# Patient Record
Sex: FEMALE | Race: WHITE | NOT HISPANIC OR LATINO | Employment: FULL TIME | URBAN - METROPOLITAN AREA
[De-identification: names, ages, dates, MRNs, and addresses within clinical notes are randomized per-mention and may not be internally consistent; named-entity substitution may affect disease eponyms.]

---

## 2017-01-04 ENCOUNTER — ALLSCRIPTS OFFICE VISIT (OUTPATIENT)
Dept: OTHER | Facility: OTHER | Age: 22
End: 2017-01-04

## 2017-07-27 ENCOUNTER — TRANSCRIBE ORDERS (OUTPATIENT)
Dept: ADMINISTRATIVE | Facility: HOSPITAL | Age: 22
End: 2017-07-27

## 2017-07-27 DIAGNOSIS — B18.2 CHRONIC HEPATITIS C WITH HEPATIC COMA (HCC): Primary | ICD-10-CM

## 2017-08-09 ENCOUNTER — HOSPITAL ENCOUNTER (OUTPATIENT)
Dept: RADIOLOGY | Facility: HOSPITAL | Age: 22
Discharge: HOME | End: 2017-08-09
Payer: COMMERCIAL

## 2017-08-09 DIAGNOSIS — B18.2 CHRONIC HEPATITIS C WITH HEPATIC COMA (HCC): ICD-10-CM

## 2017-08-09 PROCEDURE — 76705 ECHO EXAM OF ABDOMEN: CPT

## 2017-11-01 ENCOUNTER — ALLSCRIPTS OFFICE VISIT (OUTPATIENT)
Dept: OTHER | Facility: OTHER | Age: 22
End: 2017-11-01

## 2017-11-01 LAB
CLUE CELL (HISTORICAL): NORMAL
HYPHAL YEAST (HISTORICAL): NORMAL
KOH PREP (HISTORICAL): NORMAL
PH UR STRIP.AUTO: 5 [PH]
TRICHOMONAS (HISTORICAL): NORMAL
YEAST (HISTORICAL): NORMAL

## 2018-01-13 VITALS
SYSTOLIC BLOOD PRESSURE: 91 MMHG | DIASTOLIC BLOOD PRESSURE: 54 MMHG | OXYGEN SATURATION: 98 % | TEMPERATURE: 98.4 F | WEIGHT: 139 LBS | HEART RATE: 79 BPM | BODY MASS INDEX: 23.49 KG/M2

## 2018-01-14 VITALS
SYSTOLIC BLOOD PRESSURE: 106 MMHG | BODY MASS INDEX: 21.66 KG/M2 | DIASTOLIC BLOOD PRESSURE: 68 MMHG | WEIGHT: 130 LBS | HEIGHT: 65 IN

## 2018-01-14 NOTE — MISCELLANEOUS
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  Pt advised she has a high viral load of >164,000  We reviewed she should not drink alcohol and that she may have permanent liver damage  We also reviewed the risk of hepatocellular carcinoma  Pt advised to see GI ASAP  Phone number given for local GI group and for one in [facility] where patient is in rehab  Pt directed to [facility] web site for further information  All questions answered  to call with any further questions        Plan  Hepatitis C virus infection, unspecified chronicity    · *1 - 1 [facility] Physician Referral  Consult  Status: Hold For -  Scheduling  Requested for: [id number]  Care Summary provided  : Yes    Signatures   Electronically signed by : MYCHAL Deal ; Nov 23 2016  1:15PM EST                       (Author)

## 2018-10-11 ENCOUNTER — ANNUAL EXAM (OUTPATIENT)
Dept: OBGYN CLINIC | Facility: CLINIC | Age: 23
End: 2018-10-11
Payer: COMMERCIAL

## 2018-10-11 VITALS
SYSTOLIC BLOOD PRESSURE: 102 MMHG | HEIGHT: 65 IN | DIASTOLIC BLOOD PRESSURE: 64 MMHG | BODY MASS INDEX: 20.99 KG/M2 | WEIGHT: 126 LBS

## 2018-10-11 DIAGNOSIS — Z01.411 ENCOUNTER FOR GYNECOLOGICAL EXAMINATION WITH ABNORMAL FINDING: Primary | ICD-10-CM

## 2018-10-11 DIAGNOSIS — Z11.3 SCREENING EXAMINATION FOR VENEREAL DISEASE: ICD-10-CM

## 2018-10-11 DIAGNOSIS — B19.20 HEPATITIS C VIRUS INFECTION WITHOUT HEPATIC COMA, UNSPECIFIED CHRONICITY: ICD-10-CM

## 2018-10-11 DIAGNOSIS — Z30.09 ENCOUNTER FOR GENERAL COUNSELING AND ADVICE ON CONTRACEPTIVE MANAGEMENT: ICD-10-CM

## 2018-10-11 PROCEDURE — 99385 PREV VISIT NEW AGE 18-39: CPT | Performed by: NURSE PRACTITIONER

## 2018-10-11 NOTE — PROGRESS NOTES
Assessment / Plan    1  Encounter for gynecological examination with abnormal finding  Normal well woman exam  Pap smear obtained    2  Encounter for general counseling and advice on contraceptive management  Given history of hepatitis C infection and lack of recent follow up, would recommend LN-IUD as best option for birth control  After assessment of her liver function and proper follow up, will consider other options if necessary  3  Hepatitis C virus infection without hepatic coma, unspecified chronicity  As above  Given referral for PCP  - Hepatic function panel; Future  - Hepatic function panel    4  Screening examination for venereal disease  W/ GC and chlamydia off pap    - HIV 1/2 AG-AB combo; Future  - RPR; Future  - Hepatitis B surface antigen; Future  - RPR      Subjective      Joan Benz is a 25 y o  female who presents for her annual gynecologic exam     H/o hepatitis C infection related to IV drug abuse  She has been clean for over 2 years  Was treated with 3 months of antivirals approximately one year ago  Has not had any follow up since due to bills being to costly  Sexually active, partner of 4 months duration  Using condoms  Interested in birth control  Last pap: none yet  STD screenin    Periods are regular every 28-30 days  Current contraception: condoms  History of abnormal Pap smear: no  Family history of breast,uterine, ovarian or colon cancer: no    Menstrual History:  OB History      Para Term  AB Living    1 1       1    SAB TAB Ectopic Multiple Live Births                      Menarche age: 6  Patient's last menstrual period was 2018 (exact date)    Period Cycle (Days): 28  Period Pattern: Regular  Dysmenorrhea: None    The following portions of the patient's history were reviewed and updated as appropriate: allergies, current medications, past family history, past medical history, past social history, past surgical history and problem list     Review of Systems      Review of Systems   Constitutional: Negative for chills and fever  Gastrointestinal: Negative for abdominal distention, abdominal pain, blood in stool, constipation, diarrhea, nausea and vomiting  Genitourinary: Negative for difficulty urinating, dysuria, frequency, genital sores, hematuria, menstrual problem, pelvic pain, urgency, vaginal bleeding and vaginal discharge  Breasts:  Negative for skin changes, dimpling, asymmetry, nipple discharge, redness, tenderness or palpable masses    Objective      /64 (BP Location: Left arm, Patient Position: Sitting)   Ht 5' 4 57" (1 64 m)   Wt 57 2 kg (126 lb)   LMP 09/23/2018 (Exact Date)   Breastfeeding? No   BMI 21 25 kg/m²      Physical Exam   Constitutional: She is oriented to person, place, and time  She appears well-developed and well-nourished  No distress  Neck: Neck supple  No thyromegaly present  Pulmonary/Chest: Right breast exhibits no inverted nipple, no mass, no nipple discharge, no skin change and no tenderness  Left breast exhibits no inverted nipple, no mass, no nipple discharge, no skin change and no tenderness  Breasts are symmetrical    Abdominal: Soft  Normal appearance  She exhibits no mass  There is no tenderness  There is no CVA tenderness  Genitourinary: No labial fusion  There is no rash, tenderness, lesion or injury on the right labia  There is no rash, tenderness, lesion or injury on the left labia  Uterus is not enlarged and not tender  Cervix exhibits no motion tenderness, no discharge and no friability  Right adnexum displays no mass and no tenderness  Left adnexum displays no mass and no tenderness  No erythema, tenderness or bleeding in the vagina  No foreign body in the vagina  No signs of injury around the vagina  No vaginal discharge found  Lymphadenopathy:     She has no cervical adenopathy  She has no axillary adenopathy          Right: No inguinal and no supraclavicular adenopathy present  Left: No inguinal and no supraclavicular adenopathy present  Neurological: She is alert and oriented to person, place, and time  Skin: Skin is warm, dry and intact  Psychiatric: She has a normal mood and affect   Her behavior is normal

## 2018-10-15 LAB
C TRACH RRNA SPEC QL NAA+PROBE: NOT DETECTED
CLINICAL INFO: NORMAL
CYTO CVX: NORMAL
CYTOLOGY CMNT CVX/VAG CYTO-IMP: NORMAL
DATE PREVIOUS BX: NORMAL
LMP START DATE: NORMAL
N GONORRHOEA RRNA SPEC QL NAA+PROBE: NOT DETECTED
SL AMB PREV. PAP:: NORMAL
SPECIMEN SOURCE CVX/VAG CYTO: NORMAL

## 2018-11-15 ENCOUNTER — PROCEDURE VISIT (OUTPATIENT)
Dept: OBGYN CLINIC | Facility: CLINIC | Age: 23
End: 2018-11-15
Payer: COMMERCIAL

## 2018-11-15 VITALS
SYSTOLIC BLOOD PRESSURE: 106 MMHG | HEART RATE: 90 BPM | DIASTOLIC BLOOD PRESSURE: 64 MMHG | BODY MASS INDEX: 21.69 KG/M2 | WEIGHT: 128.6 LBS

## 2018-11-15 DIAGNOSIS — Z30.430 ENCOUNTER FOR IUD INSERTION: Primary | ICD-10-CM

## 2018-11-15 LAB — SL AMB POCT URINE HCG: NEGATIVE

## 2018-11-15 PROCEDURE — 58300 INSERT INTRAUTERINE DEVICE: CPT | Performed by: NURSE PRACTITIONER

## 2018-11-15 PROCEDURE — 81025 URINE PREGNANCY TEST: CPT | Performed by: NURSE PRACTITIONER

## 2018-11-15 NOTE — PROGRESS NOTES
IUD Insertion Procedure Note    Patient was fully counseled about the risks/benefits of IUD at her last visit and she desires to proceed with insertion today  Screening GC and chlamydia cultures were negative  Urine pregnancy test was negative  Pre-operative Diagnosis: IUD insertion    Post-operative Diagnosis: same    Indications: contraception    Procedure Details   Urine pregnancy test was done today and result was negative  Her LMP was 11/11/18  The risks (including infection, bleeding, pain, ectopic pregnancy and uterine perforation) and benefits of the procedure were explained to the patient and Verbal informed consent was obtained  Cervix cleansed with Betadine  Uterus sounded to 7 cm  IUD inserted without difficulty  Strings trimmed to 3 0 cm  Patient tolerated procedure well  IUD Information:  Mirena, Lot # [id number] Baldwin date 4/2021    Condition:  Stable    Complications:  None    Plan:    The patient was advised to call for any fever or for prolonged or severe pain or bleeding  She was advised to use NSAID as needed for mild to moderate pain  Post IUD instructions reviewed with patient  RV 5 wks for IUD check

## 2018-12-07 ENCOUNTER — TELEPHONE (OUTPATIENT)
Dept: OBGYN CLINIC | Facility: CLINIC | Age: 23
End: 2018-12-07

## 2018-12-07 DIAGNOSIS — N92.0 MENORRHAGIA DUE TO INTRAUTERINE DEVICE (IUD) (HCC): Primary | ICD-10-CM

## 2018-12-07 DIAGNOSIS — T83.89XA MENORRHAGIA DUE TO INTRAUTERINE DEVICE (IUD) (HCC): Primary | ICD-10-CM

## 2018-12-07 NOTE — TELEPHONE ENCOUNTER
Patient called stating she had the IUD in 11/15/18  She is experiencing heavy bleeding which requires her to change a pad and tampon every two hours  Per are discussion please put in an order for her to have an ultrasound

## 2019-04-15 ENCOUNTER — OFFICE VISIT (OUTPATIENT)
Dept: OBGYN CLINIC | Facility: CLINIC | Age: 24
End: 2019-04-15
Payer: COMMERCIAL

## 2019-04-15 VITALS — BODY MASS INDEX: 22.26 KG/M2 | SYSTOLIC BLOOD PRESSURE: 110 MMHG | WEIGHT: 132 LBS | DIASTOLIC BLOOD PRESSURE: 60 MMHG

## 2019-04-15 DIAGNOSIS — B96.89 BACTERIAL VAGINOSIS: Primary | ICD-10-CM

## 2019-04-15 DIAGNOSIS — N76.0 BACTERIAL VAGINOSIS: Primary | ICD-10-CM

## 2019-04-15 LAB — SL AMB POCT WET MOUNT: ABNORMAL

## 2019-04-15 PROCEDURE — 99213 OFFICE O/P EST LOW 20 MIN: CPT | Performed by: NURSE PRACTITIONER

## 2019-04-15 PROCEDURE — 87210 SMEAR WET MOUNT SALINE/INK: CPT | Performed by: NURSE PRACTITIONER

## 2019-04-15 RX ORDER — METRONIDAZOLE 500 MG/1
500 TABLET ORAL EVERY 12 HOURS SCHEDULED
Qty: 14 TABLET | Refills: 0 | Status: SHIPPED | OUTPATIENT
Start: 2019-04-15 | End: 2019-04-22

## 2019-07-02 ENCOUNTER — OFFICE VISIT (OUTPATIENT)
Dept: OBGYN CLINIC | Facility: CLINIC | Age: 24
End: 2019-07-02
Payer: COMMERCIAL

## 2019-07-02 VITALS
BODY MASS INDEX: 21.79 KG/M2 | OXYGEN SATURATION: 100 % | SYSTOLIC BLOOD PRESSURE: 112 MMHG | WEIGHT: 129.2 LBS | HEART RATE: 68 BPM | DIASTOLIC BLOOD PRESSURE: 62 MMHG

## 2019-07-02 DIAGNOSIS — N89.8 VAGINAL DISCHARGE: ICD-10-CM

## 2019-07-02 DIAGNOSIS — Z11.3 SCREENING EXAMINATION FOR STD (SEXUALLY TRANSMITTED DISEASE): ICD-10-CM

## 2019-07-02 DIAGNOSIS — N76.0 RECURRENT VAGINITIS: Primary | ICD-10-CM

## 2019-07-02 PROCEDURE — 99213 OFFICE O/P EST LOW 20 MIN: CPT | Performed by: NURSE PRACTITIONER

## 2019-07-02 RX ORDER — METRONIDAZOLE 7.5 MG/G
1 GEL VAGINAL DAILY
Qty: 70 G | Refills: 0 | Status: SHIPPED | OUTPATIENT
Start: 2019-07-02 | End: 2019-07-07

## 2019-07-02 NOTE — PROGRESS NOTES
Assessment/Plan:    1  Recurrent vaginitis  Clinically c/w BV  Sureswab obtained to confirm since recurrent  Rx sent for vaginal metrogel since tried oral last time  Discussed prevention strategies, probiotics, repHresh  If reoccurs again, will plan suppressive regimen  - metroNIDAZOLE (METROGEL) 0 75 % vaginal gel; Insert 1 application into the vagina daily for 5 days  Dispense: 70 g; Refill: 0  - Sureswab(R), Bacterial Vaginosis/Vaginitis  - Chlamydia/GC amplified DNA by PCR    2  Screening examination for STD (sexually transmitted disease)    - Sureswab(R), Bacterial Vaginosis/Vaginitis  - Chlamydia/GC amplified DNA by PCR      Subjective:      Patient ID: Nhan Otto is a 21 y o  female  HPI  PROBLEM VISIT  CC: vaginal infection    22 yo  presents with reoccurrence of foul smelling vaginal discharge  She was treated for BV in 2019 with oral metronidazole  Vaginal discharge, yellowish green with odor  No itching or burning  No abn vaginal bleeding or pelvic pain  No recent ab treatment  Current partner of one year-- using condoms    The following portions of the patient's history were reviewed and updated as appropriate: allergies, current medications, past family history, past medical history, past social history, past surgical history and problem list     Review of Systems   Constitutional: Negative for chills and fever  Genitourinary: Negative for dyspareunia, dysuria, frequency, genital sores, hematuria, menstrual problem, pelvic pain, urgency, vaginal bleeding, vaginal discharge and vaginal pain  Recent antibiotic treatment: no         Objective:    /62 (BP Location: Left arm, Patient Position: Sitting, Cuff Size: Adult)   Pulse 68   Wt 58 6 kg (129 lb 3 2 oz)   SpO2 100%   BMI 21 79 kg/m²        Physical Exam   Constitutional: She appears well-developed  Genitourinary: Pelvic exam was performed with patient supine   There is no rash, tenderness, lesion or injury on the right labia  There is no rash, tenderness, lesion or injury on the left labia  Uterus is not enlarged and not tender  Cervix exhibits no motion tenderness, no discharge and no friability  Right adnexum displays no mass and no tenderness  Left adnexum displays no mass and no tenderness  No erythema, tenderness or bleeding in the vagina  No foreign body in the vagina  No signs of injury around the vagina  Vaginal discharge (foul smelling, blood tinged) found  Lymphadenopathy:        Right: No inguinal adenopathy present  Left: No inguinal adenopathy present

## 2019-07-05 ENCOUNTER — TELEPHONE (OUTPATIENT)
Dept: OBGYN CLINIC | Facility: CLINIC | Age: 24
End: 2019-07-05

## 2019-07-05 DIAGNOSIS — A59.01 TRICHOMONAS VAGINITIS: Primary | ICD-10-CM

## 2019-07-05 LAB
A VAGINAE DNA VAG NAA+PROBE-LOG#: 7.6 LOG (CELLS/ML)
C GLABRATA DNA VAG QL NAA+PROBE: NOT DETECTED
C TRACH RRNA SPEC QL NAA+PROBE: NOT DETECTED
CANDIDA DNA VAG QL NAA+PROBE: NOT DETECTED
G VAGINALIS DNA VAG NAA+PROBE-LOG#: 7.8 LOG (CELLS/ML)
LACTOBACILLUS DNA VAG NAA+PROBE-LOG#: NOT DETECTED
MEGASPHAERA SP DNA VAG NAA+PROBE-LOG#: NOT DETECTED
N GONORRHOEA RRNA SPEC QL NAA+PROBE: NOT DETECTED
SL AMB BV CATEGORY:: ABNORMAL
SL AMB C. PARAPSILOSIS, DNA: NOT DETECTED
SL AMB C. TROPICALIS, DNA: NOT DETECTED
T VAGINALIS RRNA SPEC QL NAA+PROBE: DETECTED

## 2019-07-05 RX ORDER — METRONIDAZOLE 500 MG/1
TABLET ORAL
Qty: 4 TABLET | Refills: 0 | Status: SHIPPED | OUTPATIENT
Start: 2019-07-05 | End: 2019-07-12

## 2019-07-05 NOTE — TELEPHONE ENCOUNTER
Vaginitis culture is positive for trichomonas  BV and candida still pending  GC and chlamydia negative  She was prescribed metrogel at time of her visit  Discussed results with her  Since using a vaginal antibiotic I will also send a one time oral dose of metronidazole  Advised to inform her partner, he needs treatment asap, abstain from sex until after he is treated, use condoms all the time for STD protection  She desires to come back for a test of cure  She will schedule that visit in approximately 10 days

## 2019-08-08 ENCOUNTER — OFFICE VISIT (OUTPATIENT)
Dept: OBGYN CLINIC | Facility: CLINIC | Age: 24
End: 2019-08-08
Payer: COMMERCIAL

## 2019-08-08 VITALS
BODY MASS INDEX: 19.53 KG/M2 | WEIGHT: 124.4 LBS | HEIGHT: 67 IN | SYSTOLIC BLOOD PRESSURE: 110 MMHG | DIASTOLIC BLOOD PRESSURE: 68 MMHG

## 2019-08-08 DIAGNOSIS — N76.0 RECURRENT VAGINITIS: Primary | ICD-10-CM

## 2019-08-08 LAB — SL AMB POCT WET MOUNT: ABNORMAL

## 2019-08-08 PROCEDURE — 99213 OFFICE O/P EST LOW 20 MIN: CPT | Performed by: NURSE PRACTITIONER

## 2019-08-08 PROCEDURE — 87210 SMEAR WET MOUNT SALINE/INK: CPT | Performed by: NURSE PRACTITIONER

## 2019-08-08 RX ORDER — METRONIDAZOLE 500 MG/1
500 TABLET ORAL EVERY 12 HOURS SCHEDULED
Qty: 28 TABLET | Refills: 0 | Status: SHIPPED | OUTPATIENT
Start: 2019-08-08 | End: 2019-08-22

## 2019-08-08 RX ORDER — BORIC ACID
POWDER (GRAM) MISCELLANEOUS
Qty: 1 BOTTLE | Refills: 3 | OUTPATIENT
Start: 2019-08-08 | End: 2021-08-18 | Stop reason: ALTCHOICE

## 2019-08-08 RX ORDER — METRONIDAZOLE 7.5 MG/G
GEL VAGINAL
Qty: 70 G | Refills: 0 | Status: SHIPPED | OUTPATIENT
Start: 2019-08-08 | End: 2020-10-13

## 2019-08-08 NOTE — PROGRESS NOTES
Assessment/Plan:    1  Recurrent vaginitis  Wet mount negative for trichomonas  Positive for BV again  Since 3rd infection will treat suppressively with metronidazole then boric acid suppositories to follow  Rx sent for metronidazole and metrogel  Will have boric acid called in to [facility]  Subjective:      Patient ID: Jalil Andrew is a 21 y o  female  HPI    Just treated for BV with vaginal metrogel x 5 nights  Was also treated with a single 2 gram dose of metronidazole for trichomonas  Had sex with her partner before he had completed his treatment, no condom  Reports discharge with odor  No pelvic pain or abn bleeding  No urinary symptoms  Has had BV April, July and today  The following portions of the patient's history were reviewed and updated as appropriate: allergies, current medications, past family history, past medical history, past social history, past surgical history and problem list     Review of Systems   Constitutional: Negative for chills and fever  Genitourinary: Positive for vaginal discharge (with odor)  Negative for dyspareunia, dysuria, frequency, genital sores, hematuria, menstrual problem, pelvic pain, urgency, vaginal bleeding and vaginal pain  Recent antibiotic treatment- yes         Objective:    /68 (BP Location: Left arm, Patient Position: Sitting, Cuff Size: Standard)   Ht 5' 6 5" (1 689 m)   Wt 56 4 kg (124 lb 6 4 oz)   BMI 19 78 kg/m²     Wet mount: many clue cells, neg trich, neg yeast, elevated pH, pos amine     Physical Exam   Constitutional: She is oriented to person, place, and time  She appears well-developed and well-nourished  No distress  HENT:   Head: Normocephalic and atraumatic  Eyes: Pupils are equal, round, and reactive to light  Pulmonary/Chest: Effort normal    Genitourinary: Pelvic exam was performed with patient supine  There is no rash, tenderness, lesion or injury on the right labia   There is no rash, tenderness, lesion or injury on the left labia  Uterus is not enlarged and not tender  Cervix exhibits no motion tenderness, no discharge and no friability  Right adnexum displays no mass and no tenderness  Left adnexum displays no mass and no tenderness  No erythema, tenderness or bleeding in the vagina  No foreign body in the vagina  No signs of injury around the vagina  Vaginal discharge (malodorous) found  Genitourinary Comments: + IUD strings   Lymphadenopathy:        Right: No inguinal adenopathy present  Left: No inguinal adenopathy present  Neurological: She is alert and oriented to person, place, and time  Psychiatric: She has a normal mood and affect   Her behavior is normal  Thought content normal

## 2019-08-08 NOTE — PATIENT INSTRUCTIONS
Take oral metronidazole twice daily for two weeks  One week after finishing the oral, use vaginal applicator of metrogel once a week for six weeks  Then use vaginal boric acid suppositories:  Daily for two weeks, then just twice a week for maintenance

## 2020-05-20 ENCOUNTER — TELEPHONE (OUTPATIENT)
Dept: OBGYN CLINIC | Facility: CLINIC | Age: 25
End: 2020-05-20

## 2020-10-07 ENCOUNTER — OFFICE VISIT (OUTPATIENT)
Dept: OBGYN CLINIC | Facility: CLINIC | Age: 25
End: 2020-10-07
Payer: COMMERCIAL

## 2020-10-07 VITALS
WEIGHT: 124.6 LBS | BODY MASS INDEX: 19.56 KG/M2 | HEART RATE: 87 BPM | SYSTOLIC BLOOD PRESSURE: 102 MMHG | DIASTOLIC BLOOD PRESSURE: 54 MMHG | HEIGHT: 67 IN | OXYGEN SATURATION: 94 % | TEMPERATURE: 98.3 F

## 2020-10-07 DIAGNOSIS — Z11.3 SCREENING EXAMINATION FOR STD (SEXUALLY TRANSMITTED DISEASE): Primary | ICD-10-CM

## 2020-10-07 DIAGNOSIS — N76.0 RECURRENT VAGINITIS: ICD-10-CM

## 2020-10-07 DIAGNOSIS — Z30.431 INTRAUTERINE DEVICE SURVEILLANCE: ICD-10-CM

## 2020-10-07 PROCEDURE — G0101 CA SCREEN;PELVIC/BREAST EXAM: HCPCS | Performed by: NURSE PRACTITIONER

## 2020-10-13 ENCOUNTER — TELEPHONE (OUTPATIENT)
Dept: OBGYN CLINIC | Facility: CLINIC | Age: 25
End: 2020-10-13

## 2020-10-13 DIAGNOSIS — B96.89 BACTERIAL VAGINOSIS: Primary | ICD-10-CM

## 2020-10-13 DIAGNOSIS — N76.0 BACTERIAL VAGINOSIS: Primary | ICD-10-CM

## 2020-10-13 LAB
A VAGINAE DNA VAG NAA+PROBE-LOG#: 7.1 LOG (CELLS/ML)
C GLABRATA DNA VAG QL NAA+PROBE: NOT DETECTED
C TRACH RRNA SPEC QL NAA+PROBE: NOT DETECTED
CANDIDA DNA VAG QL NAA+PROBE: NOT DETECTED
G VAGINALIS DNA VAG NAA+PROBE-LOG#: >8 LOG (CELLS/ML)
LACTOBACILLUS DNA VAG NAA+PROBE-LOG#: NOT DETECTED LOG CELLS/ML
MEGASPHAERA SP DNA VAG NAA+PROBE-LOG#: 8 LOG (CELLS/ML)
N GONORRHOEA RRNA SPEC QL NAA+PROBE: NOT DETECTED
SL AMB BV CATEGORY:: ABNORMAL
SL AMB C. PARAPSILOSIS, DNA: NOT DETECTED
SL AMB C. TROPICALIS, DNA: NOT DETECTED
T VAGINALIS RRNA SPEC QL NAA+PROBE: NOT DETECTED

## 2020-10-13 RX ORDER — METRONIDAZOLE 500 MG/1
500 TABLET ORAL EVERY 12 HOURS SCHEDULED
Qty: 14 TABLET | Refills: 0 | Status: SHIPPED | OUTPATIENT
Start: 2020-10-13 | End: 2020-10-21

## 2020-10-21 ENCOUNTER — OFFICE VISIT (OUTPATIENT)
Dept: FAMILY MEDICINE CLINIC | Facility: CLINIC | Age: 25
End: 2020-10-21
Payer: COMMERCIAL

## 2020-10-21 VITALS
RESPIRATION RATE: 16 BRPM | HEIGHT: 65 IN | TEMPERATURE: 99.1 F | HEART RATE: 88 BPM | SYSTOLIC BLOOD PRESSURE: 100 MMHG | WEIGHT: 125 LBS | BODY MASS INDEX: 20.83 KG/M2 | DIASTOLIC BLOOD PRESSURE: 64 MMHG

## 2020-10-21 DIAGNOSIS — Z00.00 ENCOUNTER FOR PREVENTIVE CARE: Primary | ICD-10-CM

## 2020-10-21 DIAGNOSIS — Z78.9 ELECTRONIC CIGARETTE USE: ICD-10-CM

## 2020-10-21 DIAGNOSIS — Z23 NEED FOR INFLUENZA VACCINATION: ICD-10-CM

## 2020-10-21 PROCEDURE — 90686 IIV4 VACC NO PRSV 0.5 ML IM: CPT

## 2020-10-21 PROCEDURE — 4004F PT TOBACCO SCREEN RCVD TLK: CPT | Performed by: FAMILY MEDICINE

## 2020-10-21 PROCEDURE — 90471 IMMUNIZATION ADMIN: CPT

## 2020-10-21 PROCEDURE — 3725F SCREEN DEPRESSION PERFORMED: CPT | Performed by: FAMILY MEDICINE

## 2020-10-21 PROCEDURE — 99385 PREV VISIT NEW AGE 18-39: CPT | Performed by: FAMILY MEDICINE

## 2021-01-18 ENCOUNTER — OFFICE VISIT (OUTPATIENT)
Dept: OBGYN CLINIC | Facility: CLINIC | Age: 26
End: 2021-01-18
Payer: COMMERCIAL

## 2021-01-18 VITALS
HEART RATE: 78 BPM | DIASTOLIC BLOOD PRESSURE: 68 MMHG | WEIGHT: 124 LBS | SYSTOLIC BLOOD PRESSURE: 100 MMHG | BODY MASS INDEX: 20.79 KG/M2

## 2021-01-18 DIAGNOSIS — N76.0 RECURRENT VAGINITIS: Primary | ICD-10-CM

## 2021-01-18 PROCEDURE — 99213 OFFICE O/P EST LOW 20 MIN: CPT | Performed by: NURSE PRACTITIONER

## 2021-01-18 PROCEDURE — 4004F PT TOBACCO SCREEN RCVD TLK: CPT | Performed by: NURSE PRACTITIONER

## 2021-01-18 NOTE — PROGRESS NOTES
Assessment/Plan:    1  Recurrent vaginitis  Recurrent BV  Vaginal culture obtained  If BV will treated with extended suppressive regimen with boric acid suppositories to follow    - Sureswab(R), Candidiasis, PCR  - Sureswab(R) Bacterial Vaginosis DNA, Qn, PCR      Subjective:      Patient ID: Colette Blanco is a 22 y o  female  HPI  PROBLEM VISIT  CC: recurrent BV    21 yo  presents with c/o vaginal discharge with odor  Has hx of BV and believes it to be the same  She denies itching  Not currently sexually active  Last seen 10/2020 for same complaint  Vaginal culture was positive for BV  She was treated with oral metronidazole and advised to start boric acid suppositories  Completed antibiotic in October and used boric acid supps daily for two weeks then 2-3 times a week  Last boric acid used about 4 days  Today's symptoms of vaginal discharge with odor started about 2 weeks ago  Had a tiny bit of alicia brown discharge for 2-3 days last week  Last SA in early Dec 2020  Mirena IUD    The following portions of the patient's history were reviewed and updated as appropriate: allergies, current medications, past family history, past medical history, past social history, past surgical history and problem list     Review of Systems   Constitutional: Negative for chills and fever  Respiratory: Negative for cough and shortness of breath  Genitourinary: Positive for vaginal discharge (with odor)  Negative for dyspareunia, dysuria, frequency, genital sores, hematuria, menstrual problem, pelvic pain, urgency, vaginal bleeding and vaginal pain  Recent antibiotic treatment- no   Musculoskeletal: Negative for arthralgias and myalgias           Objective:      /68 (BP Location: Left arm, Patient Position: Sitting, Cuff Size: Standard)   Pulse 78   Wt 56 2 kg (124 lb)   LMP 2021 (Exact Date)   BMI 20 79 kg/m²      Physical Exam  Constitutional:       General: She is not in acute distress  Appearance: Normal appearance  She is well-developed and normal weight  She is not ill-appearing or diaphoretic  HENT:      Head: Normocephalic and atraumatic  Eyes:      Pupils: Pupils are equal, round, and reactive to light  Pulmonary:      Effort: Pulmonary effort is normal    Genitourinary:     General: Normal vulva  Exam position: Lithotomy position  Labia:         Right: No rash, tenderness, lesion or injury  Left: No rash, tenderness, lesion or injury  Vagina: No signs of injury and foreign body  Vaginal discharge (creamy) present  No erythema, tenderness or bleeding  Cervix: No cervical motion tenderness, discharge or friability  Uterus: Not enlarged and not tender  Adnexa:         Right: No mass or tenderness  Left: No mass or tenderness  Neurological:      General: No focal deficit present  Mental Status: She is alert and oriented to person, place, and time  Psychiatric:         Mood and Affect: Mood normal          Behavior: Behavior normal          Thought Content:  Thought content normal          Judgment: Judgment normal

## 2021-01-20 ENCOUNTER — TELEPHONE (OUTPATIENT)
Dept: OBGYN CLINIC | Facility: CLINIC | Age: 26
End: 2021-01-20

## 2021-01-20 DIAGNOSIS — N76.0 RECURRENT VAGINITIS: Primary | ICD-10-CM

## 2021-01-20 LAB
A VAGINAE DNA VAG NAA+PROBE-LOG#: 7 LOG (CELLS/ML)
C GLABRATA DNA VAG QL NAA+PROBE: NOT DETECTED
CANDIDA DNA VAG QL NAA+PROBE: NOT DETECTED
G VAGINALIS DNA VAG NAA+PROBE-LOG#: 7.8 LOG (CELLS/ML)
LACTOBACILLUS DNA VAG NAA+PROBE-LOG#: NOT DETECTED LOG CELLS/ML
MEGASPHAERA SP DNA VAG NAA+PROBE-LOG#: 7.4 LOG (CELLS/ML)
SL AMB BV CATEGORY:: ABNORMAL
SL AMB C. PARAPSILOSIS, DNA: NOT DETECTED
SL AMB C. TROPICALIS, DNA: NOT DETECTED

## 2021-01-20 RX ORDER — METRONIDAZOLE 500 MG/1
500 TABLET ORAL EVERY 12 HOURS SCHEDULED
Qty: 28 TABLET | Refills: 0 | Status: SHIPPED | OUTPATIENT
Start: 2021-01-20 | End: 2021-02-03

## 2021-01-20 RX ORDER — METRONIDAZOLE 7.5 MG/G
GEL VAGINAL
Qty: 70 G | Refills: 0 | Status: SHIPPED | OUTPATIENT
Start: 2021-01-20 | End: 2021-08-18 | Stop reason: ALTCHOICE

## 2021-01-20 NOTE — TELEPHONE ENCOUNTER
Spoke to patient  Informed of positive BV on culture  Rx sent for extended metronidazole treatment/ suppression  Advised to reinitiate boric acid supps after above treatment

## 2021-03-22 ENCOUNTER — OFFICE VISIT (OUTPATIENT)
Dept: OBGYN CLINIC | Facility: CLINIC | Age: 26
End: 2021-03-22
Payer: COMMERCIAL

## 2021-03-22 VITALS
WEIGHT: 132 LBS | BODY MASS INDEX: 22.14 KG/M2 | DIASTOLIC BLOOD PRESSURE: 70 MMHG | SYSTOLIC BLOOD PRESSURE: 100 MMHG | HEART RATE: 75 BPM

## 2021-03-22 DIAGNOSIS — Z11.3 SCREENING EXAMINATION FOR STD (SEXUALLY TRANSMITTED DISEASE): ICD-10-CM

## 2021-03-22 DIAGNOSIS — N76.0 RECURRENT VAGINITIS: ICD-10-CM

## 2021-03-22 DIAGNOSIS — Z30.431 INTRAUTERINE DEVICE SURVEILLANCE: ICD-10-CM

## 2021-03-22 DIAGNOSIS — N93.8 DUB (DYSFUNCTIONAL UTERINE BLEEDING): Primary | ICD-10-CM

## 2021-03-22 PROCEDURE — 99213 OFFICE O/P EST LOW 20 MIN: CPT | Performed by: NURSE PRACTITIONER

## 2021-03-22 NOTE — PROGRESS NOTES
Assessment/Plan:    1  DUB (dysfunctional uterine bleeding)  IUD strings visualized  Check pelvic US for placement  Rule out infection- G/C and vaginal cultures obtained  - US pelvis complete w transvaginal; Future    2  Screening examination for STD (sexually transmitted disease)  Desires full std screening     - Hepatitis B surface antigen; Future  - HIV 1/2 Antigen/Antibody (4th Generation) w Reflex [facility]; Future  - Sureswab(R), Vaginosis/Vaginitis Plus  - Hepatitis B surface antigen  - RPR; Future  - RPR    3  Recurrent vaginitis  Rule out BV/ candida    - Sureswab(R), Vaginosis/Vaginitis Plus    4  Intrauterine device surveillance  Normal check  Will check placement with US        Subjective:      Patient ID: Mindy Clark is a 22 y o  female  HPI  PROBLEM VISIT    21 yo  presents with complaint of  vaginal bleeding, bright red, noting upon wiping, lasting 10 days  She has a Mirena IUD which was inserted 2018 and she has been amenorrheic until this current episode of bleeding  She also reports vaginal sensitivity  Denies cramping or pelvic pain  Denies urinary frequency or urgency, but it "burns when she pees" when it hits externally  States that she and her boyfriend of 2 years broke up  She found out that he was with unfaithful to her  Also she has a new sexual partner of one month duration  Using condoms  She desires a full STD check, including HSV and serum lab work  2021 treated for BV with extended supp regimen  Hx of gonorrhea age 25; 3/2017 trichomonas      The following portions of the patient's history were reviewed and updated as appropriate: allergies, current medications, past family history, past medical history, past social history, past surgical history and problem list     Review of Systems   Constitutional: Negative for chills and fever  Respiratory: Negative for cough and shortness of breath  Genitourinary: Positive for vaginal bleeding   Negative for difficulty urinating, dysuria, frequency, genital sores, menstrual problem, urgency and vaginal discharge  Musculoskeletal: Negative for arthralgias and myalgias  Objective:    /70 (BP Location: Left arm, Patient Position: Sitting, Cuff Size: Standard)   Pulse 75   Wt 59 9 kg (132 lb)   LMP 03/12/2021 (Exact Date)   BMI 22 14 kg/m²      Physical Exam  Constitutional:       Appearance: Normal appearance  She is well-developed and normal weight  HENT:      Head: Normocephalic and atraumatic  Eyes:      Pupils: Pupils are equal, round, and reactive to light  Pulmonary:      Effort: Pulmonary effort is normal    Genitourinary:     General: Normal vulva  Exam position: Lithotomy position  Labia:         Right: No rash, tenderness, lesion or injury  Left: No rash, tenderness, lesion or injury  Vagina: No signs of injury and foreign body  Bleeding (tiny amount noted on endocervical swab) present  No vaginal discharge, erythema or tenderness  Cervix: No cervical motion tenderness, discharge or friability  Uterus: Not enlarged and not tender  Adnexa:         Right: No mass or tenderness  Left: No mass or tenderness  Skin:     General: Skin is warm and dry  Neurological:      General: No focal deficit present  Mental Status: She is alert and oriented to person, place, and time  Psychiatric:         Mood and Affect: Mood normal          Behavior: Behavior normal          Thought Content:  Thought content normal          Judgment: Judgment normal

## 2021-03-24 LAB
A VAGINAE DNA VAG NAA+PROBE-LOG#: NOT DETECTED LOG CELLS/ML
C GLABRATA DNA VAG QL NAA+PROBE: NOT DETECTED
C TRACH RRNA SPEC QL NAA+PROBE: DETECTED
CANDIDA DNA VAG QL NAA+PROBE: NOT DETECTED
G VAGINALIS DNA VAG NAA+PROBE-LOG#: NOT DETECTED LOG CELLS/ML
LACTOBACILLUS DNA VAG NAA+PROBE-LOG#: 5.5 LOG (CELLS/ML)
MEGASPHAERA SP DNA VAG NAA+PROBE-LOG#: NOT DETECTED LOG CELLS/ML
N GONORRHOEA RRNA SPEC QL NAA+PROBE: NOT DETECTED
SL AMB BV CATEGORY:: ABNORMAL
SL AMB C. PARAPSILOSIS, DNA: NOT DETECTED
SL AMB C. TROPICALIS, DNA: NOT DETECTED
T VAGINALIS RRNA SPEC QL NAA+PROBE: NOT DETECTED

## 2021-03-25 DIAGNOSIS — A74.9 CHLAMYDIA INFECTION: Primary | ICD-10-CM

## 2021-03-25 RX ORDER — AZITHROMYCIN 250 MG/1
TABLET, FILM COATED ORAL
Qty: 4 TABLET | Refills: 0 | Status: SHIPPED | OUTPATIENT
Start: 2021-03-25 | End: 2021-04-01

## 2021-03-28 LAB
HBV SURFACE AG SERPL QL IA: NORMAL
HCV AB S/CO SERPL IA: 2.7
HCV AB SERPL QL IA: REACTIVE
HCV RNA SERPL NAA+PROBE-ACNC: NORMAL IU/ML
HCV RNA SERPL NAA+PROBE-LOG IU: NORMAL LOG IU/ML
HIV 1+2 AB+HIV1 P24 AG SERPL QL IA: NORMAL
RPR SER QL: NORMAL

## 2021-04-29 ENCOUNTER — IMMUNIZATIONS (OUTPATIENT)
Dept: FAMILY MEDICINE CLINIC | Facility: HOSPITAL | Age: 26
End: 2021-04-29

## 2021-04-29 DIAGNOSIS — Z23 ENCOUNTER FOR IMMUNIZATION: Primary | ICD-10-CM

## 2021-04-29 PROCEDURE — 0011A SARS-COV-2 / COVID-19 MRNA VACCINE (MODERNA) 100 MCG: CPT

## 2021-04-29 PROCEDURE — 91301 SARS-COV-2 / COVID-19 MRNA VACCINE (MODERNA) 100 MCG: CPT

## 2021-05-26 ENCOUNTER — IMMUNIZATIONS (OUTPATIENT)
Dept: FAMILY MEDICINE CLINIC | Facility: HOSPITAL | Age: 26
End: 2021-05-26

## 2021-05-26 DIAGNOSIS — Z23 ENCOUNTER FOR IMMUNIZATION: Primary | ICD-10-CM

## 2021-05-26 PROCEDURE — 0012A SARS-COV-2 / COVID-19 MRNA VACCINE (MODERNA) 100 MCG: CPT

## 2021-05-26 PROCEDURE — 91301 SARS-COV-2 / COVID-19 MRNA VACCINE (MODERNA) 100 MCG: CPT

## 2021-08-18 ENCOUNTER — OFFICE VISIT (OUTPATIENT)
Dept: FAMILY MEDICINE CLINIC | Facility: CLINIC | Age: 26
End: 2021-08-18
Payer: COMMERCIAL

## 2021-08-18 VITALS
WEIGHT: 133.6 LBS | HEART RATE: 96 BPM | SYSTOLIC BLOOD PRESSURE: 106 MMHG | BODY MASS INDEX: 22.26 KG/M2 | HEIGHT: 65 IN | RESPIRATION RATE: 16 BRPM | DIASTOLIC BLOOD PRESSURE: 66 MMHG | TEMPERATURE: 99.1 F

## 2021-08-18 DIAGNOSIS — R30.9 PAINFUL URINATION: Primary | ICD-10-CM

## 2021-08-18 DIAGNOSIS — Z86.19 HISTORY OF CHLAMYDIA: ICD-10-CM

## 2021-08-18 LAB
SL AMB  POCT GLUCOSE, UA: NORMAL
SL AMB LEUKOCYTE ESTERASE,UA: NORMAL
SL AMB POCT BILIRUBIN,UA: NORMAL
SL AMB POCT BLOOD,UA: NORMAL
SL AMB POCT CLARITY,UA: NORMAL
SL AMB POCT COLOR,UA: YELLOW
SL AMB POCT KETONES,UA: NORMAL
SL AMB POCT NITRITE,UA: NORMAL
SL AMB POCT PH,UA: 6.5
SL AMB POCT SPECIFIC GRAVITY,UA: 1.01
SL AMB POCT URINE PROTEIN: NORMAL
SL AMB POCT UROBILINOGEN: NORMAL

## 2021-08-18 PROCEDURE — 99213 OFFICE O/P EST LOW 20 MIN: CPT | Performed by: FAMILY MEDICINE

## 2021-08-18 PROCEDURE — 81002 URINALYSIS NONAUTO W/O SCOPE: CPT | Performed by: FAMILY MEDICINE

## 2021-08-18 PROCEDURE — 3008F BODY MASS INDEX DOCD: CPT | Performed by: FAMILY MEDICINE

## 2021-08-18 PROCEDURE — 4004F PT TOBACCO SCREEN RCVD TLK: CPT | Performed by: FAMILY MEDICINE

## 2021-08-18 RX ORDER — CEPHALEXIN 500 MG/1
500 CAPSULE ORAL EVERY 12 HOURS SCHEDULED
Qty: 14 CAPSULE | Refills: 0 | Status: SHIPPED | OUTPATIENT
Start: 2021-08-18 | End: 2021-08-25

## 2021-08-18 NOTE — PROGRESS NOTES
Chief Complaint   Patient presents with    Possible UTI     Symptom painful urination for 2 days   STD Testing     F/U for Chlamydia  Health Maintenance   Topic Date Due    Pneumococcal Vaccine: Pediatrics (0 to 5 Years) and At-Risk Patients (6 to 59 Years) (1 of 2 - PPSV23) Never done    DTaP,Tdap,and Td Vaccines (7 - Td or Tdap) 05/28/2018    Influenza Vaccine (1) 09/01/2021    Cervical Cancer Screening  10/11/2021    Depression Screening PHQ  10/21/2021    Annual Physical  10/21/2021    BMI: Adult  03/22/2022    HIV Screening  Completed    HIB Vaccine  Completed    Hepatitis B Vaccine  Completed    IPV Vaccine  Completed    Meningococcal ACWY Vaccine  Completed    HPV Vaccine  Completed    COVID-19 Vaccine  Completed    Hepatitis A Vaccine  Aged Out    Hepatitis C Screening  Discontinued           Assessment/Plan:         Diagnoses and all orders for this visit:    Painful urination  -     POCT urine dip  -     cephalexin (KEFLEX) 500 mg capsule; Take 1 capsule (500 mg total) by mouth every 12 (twelve) hours for 7 days  -     Urine culture; Future    History of chlamydia  -     Chlamydia/GC amplified DNA by PCR; Future  -     Chlamydia/GC amplified DNA by PCR          Subjective:      Patient ID: Nahomy Ruiz is a 22 y o  female  HPI    Pt is here by herself  C/o painful urination for 4 days  Had frequency  Denies fever  Denies SoB, CP, n/v/abd pain  Treated for chlamydia in 3/2021  Would like to recheck  The following portions of the patient's history were reviewed and updated as appropriate: allergies, current medications, past family history, past medical history, past social history, past surgical history and problem list     Review of Systems   Constitutional: Negative for appetite change, chills and fever  HENT: Negative for congestion, ear pain, sinus pain and sore throat  Eyes: Negative for discharge and itching     Respiratory: Negative for apnea, cough, chest tightness, shortness of breath and wheezing  Cardiovascular: Negative for chest pain, palpitations and leg swelling  Gastrointestinal: Negative for abdominal pain, anal bleeding, constipation, diarrhea, nausea and vomiting  Endocrine: Negative for cold intolerance, heat intolerance and polyuria  Genitourinary: Positive for dysuria and frequency  Negative for difficulty urinating and flank pain  Musculoskeletal: Negative for arthralgias, back pain and myalgias  Skin: Negative for rash  Neurological: Negative for dizziness and headaches  Psychiatric/Behavioral: Negative for agitation  Objective:      /66 (BP Location: Left arm, Patient Position: Sitting, Cuff Size: Adult)   Pulse 96   Temp 99 1 °F (37 3 °C) (Tympanic)   Resp 16   Ht 5' 4 75" (1 645 m)   Wt 60 6 kg (133 lb 9 6 oz)   BMI 22 40 kg/m²          Physical Exam  Constitutional:       General: She is not in acute distress  Appearance: She is well-developed  HENT:      Head: Normocephalic  Eyes:      General:         Right eye: No discharge  Left eye: No discharge  Conjunctiva/sclera: Conjunctivae normal    Neck:      Thyroid: No thyromegaly  Cardiovascular:      Rate and Rhythm: Normal rate and regular rhythm  Heart sounds: Normal heart sounds  No murmur heard  No friction rub  No gallop  Pulmonary:      Effort: Pulmonary effort is normal  No respiratory distress  Breath sounds: Normal breath sounds  No wheezing or rales  Chest:      Chest wall: No tenderness  Abdominal:      General: Bowel sounds are normal  There is no distension  Palpations: Abdomen is soft  There is no mass  Tenderness: There is no abdominal tenderness  There is no guarding or rebound  Musculoskeletal:         General: No tenderness or deformity  Normal range of motion  Cervical back: Normal range of motion  Lymphadenopathy:      Cervical: No cervical adenopathy     Neurological: Mental Status: She is alert

## 2021-08-21 LAB — C TRACH RRNA SPEC QL NAA+PROBE: NORMAL

## 2023-01-26 ENCOUNTER — TELEPHONE (OUTPATIENT)
Dept: FAMILY MEDICINE CLINIC | Facility: CLINIC | Age: 28
End: 2023-01-26

## 2024-09-15 ENCOUNTER — APPOINTMENT (OUTPATIENT)
Dept: URGENT CARE | Age: 29
End: 2024-09-15
